# Patient Record
Sex: MALE | Race: OTHER | Employment: FULL TIME | URBAN - METROPOLITAN AREA
[De-identification: names, ages, dates, MRNs, and addresses within clinical notes are randomized per-mention and may not be internally consistent; named-entity substitution may affect disease eponyms.]

---

## 2017-03-10 ENCOUNTER — TELEPHONE (OUTPATIENT)
Dept: FAMILY MEDICINE CLINIC | Facility: CLINIC | Age: 27
End: 2017-03-10

## 2017-03-10 ENCOUNTER — HOSPITAL ENCOUNTER (OUTPATIENT)
Age: 27
Discharge: HOME | End: 2017-03-10
Attending: EMERGENCY MEDICINE
Payer: COMMERCIAL

## 2017-03-10 ENCOUNTER — APPOINTMENT (OUTPATIENT)
Dept: GENERAL RADIOLOGY | Age: 27
End: 2017-03-10
Attending: EMERGENCY MEDICINE
Payer: COMMERCIAL

## 2017-03-10 VITALS
RESPIRATION RATE: 18 BRPM | TEMPERATURE: 99 F | OXYGEN SATURATION: 98 % | HEART RATE: 88 BPM | SYSTOLIC BLOOD PRESSURE: 117 MMHG | DIASTOLIC BLOOD PRESSURE: 77 MMHG

## 2017-03-10 DIAGNOSIS — J40 BRONCHITIS: ICD-10-CM

## 2017-03-10 DIAGNOSIS — J02.0 STREP PHARYNGITIS: Primary | ICD-10-CM

## 2017-03-10 LAB — S PYO AG THROAT QL: POSITIVE

## 2017-03-10 PROCEDURE — 87430 STREP A AG IA: CPT

## 2017-03-10 PROCEDURE — 71020 XR CHEST PA + LAT CHEST (CPT=71020): CPT | Performed by: RADIOLOGY

## 2017-03-10 PROCEDURE — 99214 OFFICE O/P EST MOD 30 MIN: CPT

## 2017-03-10 PROCEDURE — 99213 OFFICE O/P EST LOW 20 MIN: CPT

## 2017-03-10 RX ORDER — AMOXICILLIN 500 MG/1
500 TABLET, FILM COATED ORAL 3 TIMES DAILY
Qty: 30 TABLET | Refills: 0 | Status: SHIPPED | OUTPATIENT
Start: 2017-03-10 | End: 2017-03-20

## 2017-03-10 NOTE — TELEPHONE ENCOUNTER
Reason for Call/Chief Complaint: chest congestion  Onset: one week  Nursing Assessment/Associated Symptoms: coughing, chest hurts, feels chest is very congestion.   ____________________________________________________  Medication List reviewed: yes  Gaurav Simmons

## 2017-03-10 NOTE — ED NOTES
Patient complains of a productive cough that is yellow-green, red, and black. Patient states that he does smoke cigarettes every day. Patient complains of SOB with no history of asthma. Patient in room and does not appear to be in any respiratory distress.

## 2017-03-10 NOTE — ED PROVIDER NOTES
Patient Seen in: [facility] In [address] [place] [address]    History   Patient presents with:  Cough/URI  Sore Throat    Stated Complaint: cold sx    HPI    Patient presents to the emergency department today complaining of sore throat, cough, congest Ear: Tympanic membrane and ear canal normal.   Left Ear: Tympanic membrane and ear canal normal.   Nose: Nose normal.   Mouth/Throat: Oropharyngeal exudate and posterior oropharyngeal erythema present.    Eyes: Conjunctivae and EOM are normal.   Neck: Kobi Hairston

## 2018-03-09 ENCOUNTER — OFFICE VISIT (OUTPATIENT)
Dept: INTERNAL MEDICINE CLINIC | Facility: CLINIC | Age: 28
End: 2018-03-09

## 2018-03-09 ENCOUNTER — TELEPHONE (OUTPATIENT)
Dept: OTHER | Age: 28
End: 2018-03-09

## 2018-03-09 VITALS
DIASTOLIC BLOOD PRESSURE: 78 MMHG | WEIGHT: 278 LBS | HEIGHT: 70 IN | TEMPERATURE: 99 F | HEART RATE: 92 BPM | SYSTOLIC BLOOD PRESSURE: 121 MMHG | BODY MASS INDEX: 39.8 KG/M2

## 2018-03-09 DIAGNOSIS — R19.7 DIARRHEA, UNSPECIFIED TYPE: Primary | ICD-10-CM

## 2018-03-09 PROCEDURE — 99213 OFFICE O/P EST LOW 20 MIN: CPT | Performed by: INTERNAL MEDICINE

## 2018-03-09 PROCEDURE — 99212 OFFICE O/P EST SF 10 MIN: CPT | Performed by: INTERNAL MEDICINE

## 2018-03-09 NOTE — PATIENT INSTRUCTIONS
ASSESSMENT/PLAN:   Diarrhea, unspecified type  (primary encounter diagnosis)- no need for ab, drink plenty of fluid , let us know if not better or if worst

## 2018-03-09 NOTE — PROGRESS NOTES
HPI:    Patient ID: Pramod Ca is a 33year old male.     HPI  Patient comes in today with complaint of diarrhea for 3 days he was in [facility] vacation diarrhea started as he landed back to [address] no blood in stool no fever no chills today feels little better is Normal range of motion. He exhibits no edema or tenderness. Neurological: He is alert and oriented to person, place, and time. He has normal reflexes. Skin: Skin is warm and dry. Psychiatric: He has a normal mood and affect.  His behavior is normal. J

## 2019-02-15 ENCOUNTER — OFFICE VISIT (OUTPATIENT)
Dept: FAMILY MEDICINE CLINIC | Facility: CLINIC | Age: 29
End: 2019-02-15
Payer: COMMERCIAL

## 2019-02-15 VITALS
SYSTOLIC BLOOD PRESSURE: 118 MMHG | DIASTOLIC BLOOD PRESSURE: 77 MMHG | WEIGHT: 280 LBS | HEART RATE: 65 BPM | BODY MASS INDEX: 40.09 KG/M2 | HEIGHT: 70 IN

## 2019-02-15 DIAGNOSIS — E66.01 CLASS 3 SEVERE OBESITY DUE TO EXCESS CALORIES WITHOUT SERIOUS COMORBIDITY WITH BODY MASS INDEX (BMI) OF 40.0 TO 44.9 IN ADULT (HCC): ICD-10-CM

## 2019-02-15 DIAGNOSIS — R06.83 SNORING: Primary | ICD-10-CM

## 2019-02-15 PROBLEM — E66.813 CLASS 3 SEVERE OBESITY DUE TO EXCESS CALORIES WITHOUT SERIOUS COMORBIDITY WITH BODY MASS INDEX (BMI) OF 40.0 TO 44.9 IN ADULT: Status: ACTIVE | Noted: 2019-02-15

## 2019-02-15 PROBLEM — E66.813 CLASS 3 SEVERE OBESITY DUE TO EXCESS CALORIES WITHOUT SERIOUS COMORBIDITY WITH BODY MASS INDEX (BMI) OF 40.0 TO 44.9 IN ADULT (HCC): Status: ACTIVE | Noted: 2019-02-15

## 2019-02-15 PROCEDURE — 99213 OFFICE O/P EST LOW 20 MIN: CPT | Performed by: NURSE PRACTITIONER

## 2019-02-15 NOTE — PATIENT INSTRUCTIONS
Everything you eat and drink over time matters. The right mix can help you be healthier now and in the future. Start with small changes to make healthier choices you can enjoy. Find your healthy eating style and maintain it for a lifetime.   This me is not intended as a substitute for professional medical care. Always follow your healthcare professional's instructions. What Are Snoring and Obstructive Sleep Apnea?   If Raquel Bautista ever had a stuffed-up nose, you know the feeling of trying to breathe in the structure of the nose may block breathing. A crooked (deviated) septum or swollen turbinates can make snoring worse or lead to apnea. Also, a receding jaw may make the tongue sit too far back.  Then it’s more likely to block the airway when you’re as harder. If you're overweight, ask your healthcare provider about a weight-loss program.  Exercise regularly  Exercise can help you lose weight, tone your muscles, and make your lungs work better. These changes may help improve your snoring.  Ask your health involve making changes to certain life habits. You can help your partner make and stick with these changes.  For example:  · Support and even join your partner’s exercise program.  · Be supportive if your partner gets CPAP (continuous positive airway pressu

## 2021-03-12 NOTE — ED INITIAL ASSESSMENT (HPI)
Pt presents for BP check, states yesterday he had a panic attack while at work. Felt chest pain, hot, sweaty. Denies history. Pt denies any symptom today.

## 2021-03-12 NOTE — ED PROVIDER NOTES
Patient Seen in: [facility]      History   No chief complaint on file.     Stated Complaint: high bp return to work    HPI/Subjective:   HPI    This is a well-appearing 67-year-old who presents with what he calls an anxiety attack yesterday at Head: Normocephalic and atraumatic.       Right Ear: Tympanic membrane, ear canal and external ear normal.      Left Ear: Tympanic membrane, ear canal and external ear normal.      Nose: Nose normal.      Mouth/Throat:      Mouth: Mucous membranes are moist close follow-up. I have given him a list of primary care providers he should follow-up with. I do not believe any laboratory work or imaging would be warranted at this time.   I have discussed with the patient if symptoms continue or start again he should

## 2021-08-06 ENCOUNTER — OFFICE VISIT (OUTPATIENT)
Dept: FAMILY MEDICINE CLINIC | Facility: CLINIC | Age: 31
End: 2021-08-06
Payer: COMMERCIAL

## 2021-08-06 VITALS
BODY MASS INDEX: 41.66 KG/M2 | DIASTOLIC BLOOD PRESSURE: 73 MMHG | HEIGHT: 70 IN | SYSTOLIC BLOOD PRESSURE: 111 MMHG | WEIGHT: 291 LBS | HEART RATE: 78 BPM

## 2021-08-06 DIAGNOSIS — R10.11 RUQ ABDOMINAL PAIN: Primary | ICD-10-CM

## 2021-08-06 PROCEDURE — 3008F BODY MASS INDEX DOCD: CPT | Performed by: NURSE PRACTITIONER

## 2021-08-06 PROCEDURE — 3078F DIAST BP <80 MM HG: CPT | Performed by: NURSE PRACTITIONER

## 2021-08-06 PROCEDURE — 3074F SYST BP LT 130 MM HG: CPT | Performed by: NURSE PRACTITIONER

## 2021-08-06 PROCEDURE — 99214 OFFICE O/P EST MOD 30 MIN: CPT | Performed by: NURSE PRACTITIONER

## 2021-08-06 NOTE — PATIENT INSTRUCTIONS
Cholecystitis (Presumed)     Your belly (abdominal) pain may be due to an inflammation and possible infection in the gallbladder. This is called cholecystitis. The gallbladder is a small sac under the liver. It stores and releases bile.  Bile is a fluid m gallbladder pain that go away, this is called biliary colic. Rest in bed and follow a clear liquid diet until the pain, upset stomach, and vomiting go away. Call your healthcare provider for an appointment.  Biliary colic can keep coming back and can cause (jaundice)  · Chest, arm, back, neck, or jaw pain  [employer] last reviewed this educational content on 11/1/2019  © 0014-8526 [employer]. All rights reserved. This information is not intended as a substitute for professional medical care.  Alw

## 2021-08-06 NOTE — PROGRESS NOTES
HPI  Pt here for ER follow up in [place] for gen abd pain. CBC and lipase within normal limits. Glu sl elevated but rest of cmp within normal limits. Had GI coctail in ER.    Was given pantoprazole and the other is bentyl    Eats a lot of spicy foods, drink Concerns:         Service: Not Asked        Blood Transfusions: Not Asked        Caffeine Concern: Yes          (Soda) 1 cup daily        Occupational Exposure: Not Asked        Hobby Hazards: Not Asked        Sleep Concern: Not Asked        Stress is abdominal tenderness in the right upper quadrant. Positive signs include Huang's sign. Neurological:      Mental Status: He is alert.        ER notes and labs reviewed  Assessment and Plan:   Problem List Items Addressed This Visit        Other    RUQ

## 2021-08-11 ENCOUNTER — OFFICE VISIT (OUTPATIENT)
Dept: FAMILY MEDICINE CLINIC | Facility: CLINIC | Age: 31
End: 2021-08-11
Payer: COMMERCIAL

## 2021-08-11 ENCOUNTER — LAB ENCOUNTER (OUTPATIENT)
Dept: LAB | Age: 31
End: 2021-08-11
Attending: NURSE PRACTITIONER
Payer: COMMERCIAL

## 2021-08-11 ENCOUNTER — PATIENT MESSAGE (OUTPATIENT)
Dept: FAMILY MEDICINE CLINIC | Facility: CLINIC | Age: 31
End: 2021-08-11

## 2021-08-11 VITALS
WEIGHT: 290 LBS | SYSTOLIC BLOOD PRESSURE: 120 MMHG | HEIGHT: 70 IN | BODY MASS INDEX: 41.52 KG/M2 | DIASTOLIC BLOOD PRESSURE: 81 MMHG | HEART RATE: 69 BPM

## 2021-08-11 DIAGNOSIS — K31.9 DYSPEPSIA AND DISORDER OF FUNCTION OF STOMACH: ICD-10-CM

## 2021-08-11 DIAGNOSIS — Z00.00 WELL ADULT EXAM: Primary | ICD-10-CM

## 2021-08-11 DIAGNOSIS — R10.13 DYSPEPSIA AND DISORDER OF FUNCTION OF STOMACH: ICD-10-CM

## 2021-08-11 DIAGNOSIS — E66.01 CLASS 3 SEVERE OBESITY DUE TO EXCESS CALORIES WITHOUT SERIOUS COMORBIDITY WITH BODY MASS INDEX (BMI) OF 40.0 TO 44.9 IN ADULT (HCC): ICD-10-CM

## 2021-08-11 DIAGNOSIS — Z00.00 WELL ADULT EXAM: ICD-10-CM

## 2021-08-11 DIAGNOSIS — R10.11 RUQ ABDOMINAL PAIN: ICD-10-CM

## 2021-08-11 LAB
ALBUMIN SERPL-MCNC: 3.7 G/DL (ref 3.4–5)
ALBUMIN/GLOB SERPL: 1 {RATIO} (ref 1–2)
ALP LIVER SERPL-CCNC: 65 U/L
ALT SERPL-CCNC: 22 U/L
ANION GAP SERPL CALC-SCNC: 5 MMOL/L (ref 0–18)
AST SERPL-CCNC: 19 U/L (ref 15–37)
BILIRUB SERPL-MCNC: 0.6 MG/DL (ref 0.1–2)
BUN BLD-MCNC: 10 MG/DL (ref 7–18)
BUN/CREAT SERPL: 11.4 (ref 10–20)
CALCIUM BLD-MCNC: 9 MG/DL (ref 8.5–10.1)
CHLORIDE SERPL-SCNC: 106 MMOL/L (ref 98–112)
CHOLEST SMN-MCNC: 194 MG/DL (ref ?–200)
CO2 SERPL-SCNC: 26 MMOL/L (ref 21–32)
CREAT BLD-MCNC: 0.88 MG/DL
DEPRECATED RDW RBC AUTO: 45.1 FL (ref 35.1–46.3)
ERYTHROCYTE [DISTWIDTH] IN BLOOD BY AUTOMATED COUNT: 13.8 % (ref 11–15)
EST. AVERAGE GLUCOSE BLD GHB EST-MCNC: 111 MG/DL (ref 68–126)
GLOBULIN PLAS-MCNC: 3.6 G/DL (ref 2.8–4.4)
GLUCOSE BLD-MCNC: 88 MG/DL (ref 70–99)
HBA1C MFR BLD HPLC: 5.5 % (ref ?–5.7)
HCT VFR BLD AUTO: 46.5 %
HDLC SERPL-MCNC: 42 MG/DL (ref 40–59)
HGB BLD-MCNC: 14.8 G/DL
LDLC SERPL CALC-MCNC: 127 MG/DL (ref ?–100)
M PROTEIN MFR SERPL ELPH: 7.3 G/DL (ref 6.4–8.2)
MCH RBC QN AUTO: 28.1 PG (ref 26–34)
MCHC RBC AUTO-ENTMCNC: 31.8 G/DL (ref 31–37)
MCV RBC AUTO: 88.2 FL
NONHDLC SERPL-MCNC: 152 MG/DL (ref ?–130)
OSMOLALITY SERPL CALC.SUM OF ELEC: 282 MOSM/KG (ref 275–295)
PATIENT FASTING Y/N/NP: YES
PATIENT FASTING Y/N/NP: YES
PLATELET # BLD AUTO: 252 10(3)UL (ref 150–450)
POTASSIUM SERPL-SCNC: 3.7 MMOL/L (ref 3.5–5.1)
RBC # BLD AUTO: 5.27 X10(6)UL
SODIUM SERPL-SCNC: 137 MMOL/L (ref 136–145)
TRIGL SERPL-MCNC: 142 MG/DL (ref 30–149)
TSI SER-ACNC: 1.19 MIU/ML (ref 0.36–3.74)
VIT B12 SERPL-MCNC: 328 PG/ML (ref 193–986)
VLDLC SERPL CALC-MCNC: 25 MG/DL (ref 0–30)
WBC # BLD AUTO: 6.3 X10(3) UL (ref 4–11)

## 2021-08-11 PROCEDURE — 3074F SYST BP LT 130 MM HG: CPT | Performed by: NURSE PRACTITIONER

## 2021-08-11 PROCEDURE — 99395 PREV VISIT EST AGE 18-39: CPT | Performed by: NURSE PRACTITIONER

## 2021-08-11 PROCEDURE — 80061 LIPID PANEL: CPT

## 2021-08-11 PROCEDURE — 83013 H PYLORI (C-13) BREATH: CPT | Performed by: NURSE PRACTITIONER

## 2021-08-11 PROCEDURE — 3079F DIAST BP 80-89 MM HG: CPT | Performed by: NURSE PRACTITIONER

## 2021-08-11 PROCEDURE — 85027 COMPLETE CBC AUTOMATED: CPT

## 2021-08-11 PROCEDURE — 3008F BODY MASS INDEX DOCD: CPT | Performed by: NURSE PRACTITIONER

## 2021-08-11 PROCEDURE — 83036 HEMOGLOBIN GLYCOSYLATED A1C: CPT

## 2021-08-11 PROCEDURE — 80053 COMPREHEN METABOLIC PANEL: CPT

## 2021-08-11 PROCEDURE — 82607 VITAMIN B-12: CPT

## 2021-08-11 PROCEDURE — 84443 ASSAY THYROID STIM HORMONE: CPT

## 2021-08-11 PROCEDURE — 36415 COLL VENOUS BLD VENIPUNCTURE: CPT

## 2021-08-11 NOTE — ASSESSMENT & PLAN NOTE
Declines referral at this time-states he used to work out and eat healthy all of the time in the past-used to be 100# less so he knows what he has to do.

## 2021-08-11 NOTE — PROGRESS NOTES
HPI  Pt here for adult physical.   RUQ is improved-ultrasound has not been scheduled yet. Diet-fair  Exercise-none  Works as a -is active w that.    Is -has two children ages 3 and 2    covid vaccines-x2 pfeizer  No h/o covid illne Former Smoker        Packs/day: 1.00        Types: Cigarettes        Quit date: 3/9/2017        Years since quittin.4      Smokeless tobacco: Former User    Substance and Sexual Activity      Alcohol use: Yes        Comment: weekends      Drug use:  No well-developed. He is obese. HENT:      Head: Normocephalic and atraumatic. Right Ear: Tympanic membrane, ear canal and external ear normal. There is no impacted cerumen.       Left Ear: Tympanic membrane, ear canal and external ear normal. There is Other    Class 3 severe obesity due to excess calories without serious comorbidity with body mass index (BMI) of 40.0 to 44.9 in adult [facility])     Declines referral at this time-states he used to work out and eat healthy all of the time in the past-used to b

## 2021-08-11 NOTE — ASSESSMENT & PLAN NOTE
Encourage healthy, low fat, spice and acid diet  GB ultrasound  Please call if symptoms worsen or are not resolving.

## 2021-08-12 NOTE — TELEPHONE ENCOUNTER
From: Jamie Ferguson  To: WILTON Donald  Sent: 8/11/2021 5:02 PM CDT  Subject: Visit Follow-up Question    Dennis Ferris I just wanted to see if it was possible for you to send me release paperwork so I can return back to work and also talking

## 2021-08-13 LAB — H. PYLORI BREATH TEST: NEGATIVE

## 2022-06-06 ENCOUNTER — OFFICE VISIT (OUTPATIENT)
Dept: FAMILY MEDICINE CLINIC | Facility: CLINIC | Age: 32
End: 2022-06-06
Payer: COMMERCIAL

## 2022-06-06 VITALS
DIASTOLIC BLOOD PRESSURE: 91 MMHG | HEIGHT: 70 IN | SYSTOLIC BLOOD PRESSURE: 135 MMHG | BODY MASS INDEX: 44.38 KG/M2 | WEIGHT: 310 LBS

## 2022-06-06 DIAGNOSIS — J30.1 SEASONAL ALLERGIC RHINITIS DUE TO POLLEN: Primary | ICD-10-CM

## 2022-06-06 RX ORDER — METHYLPREDNISOLONE ACETATE 80 MG/ML
80 INJECTION, SUSPENSION INTRA-ARTICULAR; INTRALESIONAL; INTRAMUSCULAR; SOFT TISSUE ONCE
Status: COMPLETED | OUTPATIENT
Start: 2022-06-06 | End: 2022-06-06

## 2022-06-06 RX ORDER — FLUTICASONE PROPIONATE 50 MCG
2 SPRAY, SUSPENSION (ML) NASAL DAILY
Qty: 3 EACH | Refills: 1 | Status: SHIPPED | OUTPATIENT
Start: 2022-06-06 | End: 2023-06-01

## 2022-06-06 RX ORDER — AZELASTINE HYDROCHLORIDE 0.5 MG/ML
1 SOLUTION/ DROPS OPHTHALMIC 2 TIMES DAILY
Qty: 6 ML | Refills: 3 | Status: SHIPPED | OUTPATIENT
Start: 2022-06-06

## 2022-06-06 RX ORDER — MONTELUKAST SODIUM 10 MG/1
10 TABLET ORAL DAILY
Qty: 90 TABLET | Refills: 3 | Status: SHIPPED | OUTPATIENT
Start: 2022-06-06 | End: 2023-06-01

## 2022-06-06 RX ADMIN — METHYLPREDNISOLONE ACETATE 80 MG: 80 INJECTION, SUSPENSION INTRA-ARTICULAR; INTRALESIONAL; INTRAMUSCULAR; SOFT TISSUE at 11:22:00

## 2022-06-07 NOTE — ASSESSMENT & PLAN NOTE
-otc non-drowsy antihistamine (generic claritin, zyrtec or allegra)  -add steroidal nasal spray (flonase, rhinocort -generic works well)    -supportive care discussed  -Please call if symptoms worsen or are not resolving.      Add singulair  Follow up allergy to discuss xolair inj

## 2024-02-14 NOTE — TELEPHONE ENCOUNTER
Pt called in for appt to see Dr. Austin Vick. He has had diarrhea for 2 days but it is resolving now. Not ready to go back to work but needs a Dr's note to be off from work over 2 days. No appt to see VS but pt was scheduled for this afternoon with Dr. Ramy Walker. Referral placed

## 2024-05-20 ENCOUNTER — HOSPITAL ENCOUNTER (OUTPATIENT)
Age: 34
Discharge: HOME | End: 2024-05-20

## 2024-05-20 VITALS
TEMPERATURE: 97 F | RESPIRATION RATE: 18 BRPM | OXYGEN SATURATION: 98 % | HEART RATE: 76 BPM | DIASTOLIC BLOOD PRESSURE: 80 MMHG | SYSTOLIC BLOOD PRESSURE: 113 MMHG

## 2024-05-20 DIAGNOSIS — J30.2 SEASONAL ALLERGIES: Primary | ICD-10-CM

## 2024-05-20 PROCEDURE — 99213 OFFICE O/P EST LOW 20 MIN: CPT | Performed by: NURSE PRACTITIONER

## 2024-05-20 RX ORDER — LEVOCETIRIZINE DIHYDROCHLORIDE 5 MG/1
5 TABLET, FILM COATED ORAL EVERY EVENING
Qty: 30 TABLET | Refills: 0 | Status: SHIPPED | OUTPATIENT
Start: 2024-05-20

## 2024-05-20 NOTE — ED PROVIDER NOTES
Patient Seen in: [facility]      History     Chief Complaint   Patient presents with    Cough/URI     Stated Complaint: Sneezing,runny nose,congestion,cough,sore throat    Subjective:   33-year-old male with no past medical history presents from home.  Patient is here with complaint of seasonal allergies.  States that he is sneezing upwards of 100 times a day.  Having runny nose, itchy eyes, throat irritation from coughing.  He is taking an unknown over-the-counter allergy pill that he found in the first-aid kit at work.  States he is requesting a yearly allergy shot.  He had an appointment with his primary doctor today but notes he was late and they would not see him.    The history is provided by the patient. No  was used.         Objective:   History reviewed. No pertinent past medical history.           History reviewed. No pertinent surgical history.             Social History     Socioeconomic History    Marital status: Single   Tobacco Use    Smoking status: Former     Current packs/day: 0.00     Types: Cigarettes     Quit date: 3/9/2017     Years since quittin.2    Smokeless tobacco: Former   Substance and Sexual Activity    Alcohol use: Yes     Comment: weekends    Drug use: No   Other Topics Concern    Caffeine Concern Yes     Comment: (Soda) 1 cup daily              Review of Systems    Positive for stated complaint: Sneezing,runny nose,congestion,cough,sore throat  Other systems are as noted in HPI.  Constitutional and vital signs reviewed.      All other systems reviewed and negative except as noted above.    Physical Exam     ED Triage Vitals [24 1555]   /80   Pulse 76   Resp 18   Temp 97.4 °F (36.3 °C)   Temp src Temporal   SpO2 98 %   O2 Device None (Room air)       Current Vitals:   Vital Signs  BP: 113/80  Pulse: 76  Resp: 18  Temp: 97.4 °F (36.3 °C)  Temp src: Temporal    Oxygen Therapy  SpO2: 98 %  O2 Device: None (Room air)            Physical  Exam  Vitals and nursing note reviewed.   Constitutional:       General: He is not in acute distress.     Appearance: Normal appearance. He is not ill-appearing or toxic-appearing.   HENT:      Head: Normocephalic and atraumatic.      Right Ear: Ear canal and external ear normal. There is impacted cerumen.      Left Ear: Tympanic membrane, ear canal and external ear normal.      Nose: Rhinorrhea present.      Mouth/Throat:      Mouth: Mucous membranes are moist.      Pharynx: Oropharynx is clear.   Eyes:      Pupils: Pupils are equal, round, and reactive to light.   Cardiovascular:      Rate and Rhythm: Normal rate and regular rhythm.      Pulses: Normal pulses.   Pulmonary:      Effort: Pulmonary effort is normal. No respiratory distress.      Breath sounds: Normal breath sounds.      Comments: Lungs clear.  No adventitious lung sounds.  No distress.  No hypoxia.  Pulse ox 98% ra. Which is normal    Abdominal:      General: Abdomen is flat.      Palpations: Abdomen is soft.   Musculoskeletal:         General: No signs of injury. Normal range of motion.      Cervical back: Normal range of motion and neck supple.   Skin:     General: Skin is warm and dry.      Capillary Refill: Capillary refill takes less than 2 seconds.   Neurological:      General: No focal deficit present.      Mental Status: He is alert and oriented to person, place, and time.      GCS: GCS eye subscore is 4. GCS verbal subscore is 5. GCS motor subscore is 6.   Psychiatric:         Mood and Affect: Mood normal.         Behavior: Behavior normal.         Thought Content: Thought content normal.         Judgment: Judgment normal.           ED Course   Labs Reviewed - No data to display    MDM         Medical Decision Making  Differential diagnosis: Allergic rhinitis, sinus infection, viral syndrome  Patient states symptoms consistent with recurrent allergy symptoms.  No evidence of bacterial infection  Here specifically requesting an allergy shot  which is not available at the   Recommend antihistamines.  Taking an unknown 1 that he found at work.  States Claritin does not work.  Recommend Xyzal.  Reschedule appointment with primary doctor    Results and plan of care discussed with the patient/family. They are in agreement with discharge. They understand to follow up with their primary doctor or the referral physician for further evaluation, especially if no improvement.  Also discussed the limitations of immediate care, patient is aware that if symptoms are worse they should go to the emergency room. Verbal and written discharge instructions were given.       Problems Addressed:  Seasonal allergies: acute illness or injury    Risk  Prescription drug management.        Disposition and Plan     Clinical Impression:  1. Seasonal allergies         Disposition:  Discharge  5/20/2024  4:02 pm    Follow-up:  Quan Larose, WILTON  [address] [place] [address]  [phone]                Medications Prescribed:  Discharge Medication List as of 5/20/2024  4:06 PM        START taking these medications    Details   levocetirizine (XYZAL ALLERGY 24HR) 5 MG Oral Tab Take 1 tablet (5 mg total) by mouth every evening., Normal, Disp-30 tablet, R-0

## 2024-05-21 ENCOUNTER — OFFICE VISIT (OUTPATIENT)
Dept: FAMILY MEDICINE CLINIC | Facility: CLINIC | Age: 34
End: 2024-05-21

## 2024-05-21 VITALS
SYSTOLIC BLOOD PRESSURE: 123 MMHG | HEART RATE: 87 BPM | HEIGHT: 70 IN | BODY MASS INDEX: 43.95 KG/M2 | WEIGHT: 307 LBS | DIASTOLIC BLOOD PRESSURE: 86 MMHG

## 2024-05-21 DIAGNOSIS — J30.1 SEASONAL ALLERGIC RHINITIS DUE TO POLLEN: Primary | ICD-10-CM

## 2024-05-21 PROCEDURE — 3074F SYST BP LT 130 MM HG: CPT | Performed by: NURSE PRACTITIONER

## 2024-05-21 PROCEDURE — 99213 OFFICE O/P EST LOW 20 MIN: CPT | Performed by: NURSE PRACTITIONER

## 2024-05-21 PROCEDURE — 3079F DIAST BP 80-89 MM HG: CPT | Performed by: NURSE PRACTITIONER

## 2024-05-21 PROCEDURE — 3008F BODY MASS INDEX DOCD: CPT | Performed by: NURSE PRACTITIONER

## 2024-05-21 PROCEDURE — 96372 THER/PROPH/DIAG INJ SC/IM: CPT | Performed by: NURSE PRACTITIONER

## 2024-05-21 RX ORDER — METHYLPREDNISOLONE ACETATE 80 MG/ML
80 INJECTION, SUSPENSION INTRA-ARTICULAR; INTRALESIONAL; INTRAMUSCULAR; SOFT TISSUE ONCE
Status: COMPLETED | OUTPATIENT
Start: 2024-05-21 | End: 2024-05-21

## 2024-05-21 RX ADMIN — METHYLPREDNISOLONE ACETATE 80 MG: 80 INJECTION, SUSPENSION INTRA-ARTICULAR; INTRALESIONAL; INTRAMUSCULAR; SOFT TISSUE at 16:49:00

## 2024-05-21 NOTE — PROGRESS NOTES
Allergies  Associated symptoms include congestion, coughing and a sore throat.     Pt presents for follow up seasonal allergies. Was seen in  yesterday for sneezing, rhinorrhea, itchy eyes, coughing and throat irritation.   He was prescribed over the counter allergy meds but would prefer injection-as received depo-medrol in the past and it works well for him. Has not started over the counter meds.   Has found that he has tried all of the different over the counter allergy meds without improvement in symptoms.     Review of Systems   Constitutional:  Negative for activity change and appetite change.   HENT:  Positive for congestion, rhinorrhea, sneezing and sore throat.    Respiratory:  Positive for cough.        Vitals:    05/21/24 1616   BP: 123/86   Pulse: 87   Weight: (!) 307 lb (139.3 kg)   Height: 5' 10\" (1.778 m)     Body mass index is 44.05 kg/m².  Wt Readings from Last 6 Encounters:   05/21/24 (!) 307 lb (139.3 kg)   06/06/22 (!) 310 lb (140.6 kg)   08/11/21 290 lb (131.5 kg)   08/06/21 291 lb (132 kg)   03/12/21 280 lb (127 kg)   02/15/19 280 lb (127 kg)         Health Maintenance   Topic Date Due    DTaP,Tdap,and Td Vaccines (1 - Tdap) Never done    Annual Physical  08/11/2022    COVID-19 Vaccine (4 - 2023-24 season) 09/01/2023    Annual Depression Screening  01/01/2024    Influenza Vaccine (Season Ended) 10/01/2024    Pneumococcal Vaccine: Birth to 64yrs  Aged Out       History reviewed. No pertinent past medical history.    .History reviewed. No pertinent surgical history.    Family History   Problem Relation Age of Onset    Hypertension Maternal Grandfather     Hypertension Paternal Grandfather        Social History     Socioeconomic History    Marital status: Single     Spouse name: Not on file    Number of children: Not on file    Years of education: Not on file    Highest education level: Not on file   Occupational History    Not on file   Tobacco Use    Smoking status: Former     Current  packs/day: 0.00     Types: Cigarettes     Quit date: 3/9/2017     Years since quittin.2    Smokeless tobacco: Former   Substance and Sexual Activity    Alcohol use: Yes     Comment: weekends    Drug use: No    Sexual activity: Not on file   Other Topics Concern     Service Not Asked    Blood Transfusions Not Asked    Caffeine Concern Yes     Comment: (Soda) 1 cup daily    Occupational Exposure Not Asked    Hobby Hazards Not Asked    Sleep Concern Not Asked    Stress Concern Not Asked    Weight Concern Not Asked    Special Diet Not Asked    Back Care Not Asked    Exercise Not Asked    Bike Helmet Not Asked    Seat Belt Not Asked    Self-Exams Not Asked   Social History Narrative    Not on file     Social Determinants of Health     Financial Resource Strain: Not on file   Food Insecurity: Not on file   Transportation Needs: Not on file   Physical Activity: Not on file   Stress: Not on file   Social Connections: Not on file   Housing Stability: Not on file       Current Outpatient Medications   Medication Sig Dispense Refill    levocetirizine (XYZAL ALLERGY 24HR) 5 MG Oral Tab Take 1 tablet (5 mg total) by mouth every evening. (Patient not taking: Reported on 2024) 30 tablet 0    Azelastine HCl 0.05 % Ophthalmic Solution Place 1 drop into the left eye 2 (two) times daily. (Patient not taking: Reported on 2024) 6 mL 3       Allergies:  No Known Allergies    Physical Exam  Vitals and nursing note reviewed.   Constitutional:       Appearance: Normal appearance. He is obese.   HENT:      Head: Normocephalic.   Cardiovascular:      Rate and Rhythm: Normal rate.   Pulmonary:      Effort: Pulmonary effort is normal. No respiratory distress.   Neurological:      Mental Status: He is alert and oriented to person, place, and time.        notes reviwed.   Assessment and Plan:   Problem List Items Addressed This Visit       Seasonal allergic rhinitis due to pollen - Primary    Relevant Medications     methylPREDNISolone acetate (DEPO-medrol) 80 MG/ML injection 80 mg         Pt encourage to return to office for annual physical .       Discussed plan of care with pt and pt is in agreement.All questions answered. Pt to call with questions or concerns.      Encouraged to sign up for My Chart if not already registered.

## 2024-06-05 ENCOUNTER — APPOINTMENT (OUTPATIENT)
Dept: GENERAL RADIOLOGY | Age: 34
End: 2024-06-05
Attending: NURSE PRACTITIONER
Payer: COMMERCIAL

## 2024-06-05 ENCOUNTER — HOSPITAL ENCOUNTER (OUTPATIENT)
Age: 34
Discharge: HOME | End: 2024-06-05
Payer: COMMERCIAL

## 2024-06-05 VITALS
TEMPERATURE: 98 F | RESPIRATION RATE: 18 BRPM | HEART RATE: 94 BPM | DIASTOLIC BLOOD PRESSURE: 84 MMHG | OXYGEN SATURATION: 98 % | SYSTOLIC BLOOD PRESSURE: 152 MMHG
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
## 2024-06-05 DIAGNOSIS — S50.01XA CONTUSION OF RIGHT ELBOW, INITIAL ENCOUNTER: ICD-10-CM

## 2024-06-05 PROCEDURE — 73080 X-RAY EXAM OF ELBOW: CPT | Performed by: NURSE PRACTITIONER

## 2024-06-05 PROCEDURE — 99213 OFFICE O/P EST LOW 20 MIN: CPT | Performed by: NURSE PRACTITIONER

## 2024-06-05 NOTE — ED PROVIDER NOTES
Patient Seen in: [facility]      History     Chief Complaint   Patient presents with    Elbow Injury     Stated Complaint: right elbow injury    Subjective:   34-year-old male with no past medical history presents from home.  Patient presents with a work injury.  Injury occurred  today around 1 PM.  He was using a tire  and it slipped and the pole hit him in the back of the right arm.  He is here complaining of a right elbow injury.  Isolated injury.  He did take ibuprofen around 2 PM and states the pain is decreased.  He has also been icing.  He is right-hand dominant.  He denies numbness or tingling to the right hand    The history is provided by the patient. No  was used.         Objective:   No pertinent past medical history.        HISTORY:  No past medical history on file.   No past surgical history on file.   Family History   Problem Relation Age of Onset    Hypertension Maternal Grandfather     Hypertension Paternal Grandfather       Social History     Socioeconomic History    Marital status: Single   Tobacco Use    Smoking status: Former     Current packs/day: 0.00     Types: Cigarettes     Quit date: 3/9/2017     Years since quittin.2    Smokeless tobacco: Former   Substance and Sexual Activity    Alcohol use: Yes     Comment: weekends    Drug use: No   Other Topics Concern    Caffeine Concern Yes     Comment: (Soda) 1 cup daily            No pertinent past surgical history.              No pertinent social history.            Review of Systems    Positive for stated complaint: right elbow injury  Other systems are as noted in HPI.  Constitutional and vital signs reviewed.      All other systems reviewed and negative except as noted above.    Physical Exam     ED Triage Vitals [24 1746]   /84   Pulse 94   Resp 18   Temp 97.7 °F (36.5 °C)   Temp src Temporal   SpO2 98 %   O2 Device None (Room air)       Current Vitals:   Vital Signs  BP: 152/84  Pulse:  94  Resp: 18  Temp: 97.7 °F (36.5 °C)  Temp src: Temporal    Oxygen Therapy  SpO2: 98 %  O2 Device: None (Room air)            Physical Exam  Vitals and nursing note reviewed.   Constitutional:       General: He is not in acute distress.     Appearance: Normal appearance. He is not ill-appearing or toxic-appearing.   HENT:      Head: Normocephalic and atraumatic.      Nose: Nose normal.      Mouth/Throat:      Mouth: Mucous membranes are moist.      Pharynx: Oropharynx is clear.   Eyes:      Pupils: Pupils are equal, round, and reactive to light.   Cardiovascular:      Rate and Rhythm: Normal rate and regular rhythm.      Pulses: Normal pulses.   Pulmonary:      Effort: Pulmonary effort is normal. No respiratory distress.      Breath sounds: Normal breath sounds.      Comments: Lungs clear.  No adventitious lung sounds.  No distress.  No hypoxia.  Pulse ox 98% ra. Which is normal    Abdominal:      General: Abdomen is flat.      Palpations: Abdomen is soft.   Musculoskeletal:         General: No signs of injury. Normal range of motion.      Right elbow: No swelling or deformity. Normal range of motion. Tenderness present in olecranon process.      Cervical back: Normal range of motion and neck supple.      Comments: No signs of trauma.  No swelling.  Patient has pain with flexion of the right elbow.  Right hand grasp intact.  CMS intact.   Skin:     General: Skin is warm and dry.      Capillary Refill: Capillary refill takes less than 2 seconds.   Neurological:      General: No focal deficit present.      Mental Status: He is alert and oriented to person, place, and time.      GCS: GCS eye subscore is 4. GCS verbal subscore is 5. GCS motor subscore is 6.   Psychiatric:         Mood and Affect: Mood normal.         Behavior: Behavior normal.         Thought Content: Thought content normal.         Judgment: Judgment normal.           ED Course   Labs Reviewed - No data to display  XR ELBOW, COMPLETE (MIN 3 VIEWS),  RIGHT (CPT=73080)    Result Date: 6/5/2024  CONCLUSION:   Mild posttraumatic olecranon bursitis without acute fracture or dislocation.     Dictated by (CST): Sathya Bourne MD on 6/05/2024 at 6:44 PM     Finalized by (CST): Sathya Bourne MD on 6/05/2024 at 6:45 PM           MDM        Medical Decision Making  Differential diagnoses reflecting the complexity of care include: Elbow fracture versus contusion  X-ray of the right elbow is negative for fracture or dislocation.  There is mild posttraumatic bursitis.  No open wounds    Plan of Care: Continue ibuprofen and ice  Patient requesting work restrictions.  Discussed with patient we are unable to specify restrictions.  I offered him a note to be off of work a few days which he declined.  He states he will follow-up with occupational health    Results and plan of care discussed with the patient/family. They are in agreement with discharge. They understand to follow up with their primary doctor or the referral physician for further evaluation, especially if no improvement.  Also discussed the limitations of immediate care, patient is aware that if symptoms are worse they should go to the emergency room. Verbal and written discharge instructions were given.     My independent interpretation of studies of: no elbow fracture              Problems Addressed:  Contusion of right elbow, initial encounter: acute illness or injury  Work related injury: acute illness or injury    Amount and/or Complexity of Data Reviewed  Radiology: ordered and independent interpretation performed. Decision-making details documented in ED Course.     Details: No fracture    Risk  OTC drugs.        Disposition and Plan     Clinical Impression:  1. Work related injury    2. Contusion of right elbow, initial encounter         Disposition:  Discharge  6/5/2024  6:50 pm    Follow-up:  Kristin Quach PA  [address] [place] [id number]  [phone]    Call       [facility]  [address] [address]  [phone]              Medications Prescribed:  Current Discharge Medication List

## 2024-06-05 NOTE — DISCHARGE INSTRUCTIONS
No fracture seen on the xray. Continue ibuprofen and ice. Follow up with orthopedics if persistent symptoms

## 2024-11-13 ENCOUNTER — NURSE TRIAGE (OUTPATIENT)
Dept: FAMILY MEDICINE CLINIC | Facility: CLINIC | Age: 34
End: 2024-11-13

## 2024-11-13 NOTE — TELEPHONE ENCOUNTER
Action Requested: Summary for Provider     []  Critical Lab, Recommendations Needed  [] Need Additional Advice  []   FYI    []   Need Orders  [] Need Medications Sent to Pharmacy  []  Other     SUMMARY: Per Protocol disposition advised to be seen in the office for abd pain.    Assisted patient with appt scheduling, verbalized understanding and agrees to plan.   Future Appointments   Date Time Provider Department Center   2024  3:45 PM Navarro Wharton DO [facility]     Reason for call: Acute  Onset: few days    Patient (name and  verified) c/o upper abd pain for the last few days. Improves with eating. Denies any vomiting, diarrhea or nausea.   Reason for Disposition   MILD pain (e.g., does not interfere with normal activities) and pain comes and goes (cramps) lasts > 48 hours  (Exception: This same abdominal pain is a chronic symptom recurrent or ongoing AND present > 4 weeks.)    Protocols used: Abdominal Pain - Male-[facility]-[place]

## 2024-11-13 NOTE — TELEPHONE ENCOUNTER
Patient scheduled an appointment via [facility] for the following concern:    Having stomach pain

## 2024-11-14 ENCOUNTER — LAB ENCOUNTER (OUTPATIENT)
Dept: LAB | Age: 34
End: 2024-11-14
Attending: FAMILY MEDICINE
Payer: COMMERCIAL

## 2024-11-14 ENCOUNTER — OFFICE VISIT (OUTPATIENT)
Dept: FAMILY MEDICINE CLINIC | Facility: CLINIC | Age: 34
End: 2024-11-14

## 2024-11-14 VITALS
DIASTOLIC BLOOD PRESSURE: 72 MMHG | BODY MASS INDEX: 45.1 KG/M2 | WEIGHT: 315 LBS | HEART RATE: 88 BPM | TEMPERATURE: 98 F | HEIGHT: 70 IN | SYSTOLIC BLOOD PRESSURE: 113 MMHG

## 2024-11-14 DIAGNOSIS — R10.11 RUQ ABDOMINAL PAIN: ICD-10-CM

## 2024-11-14 DIAGNOSIS — R10.13 EPIGASTRIC ABDOMINAL PAIN: Primary | ICD-10-CM

## 2024-11-14 DIAGNOSIS — K21.9 GASTROESOPHAGEAL REFLUX DISEASE, UNSPECIFIED WHETHER ESOPHAGITIS PRESENT: ICD-10-CM

## 2024-11-14 DIAGNOSIS — R10.13 EPIGASTRIC ABDOMINAL PAIN: ICD-10-CM

## 2024-11-14 LAB
ALBUMIN SERPL-MCNC: 4.7 G/DL (ref 3.2–4.8)
ALBUMIN/GLOB SERPL: 1.4 {RATIO} (ref 1–2)
ALP LIVER SERPL-CCNC: 77 U/L
ALT SERPL-CCNC: 15 U/L
ANION GAP SERPL CALC-SCNC: 6 MMOL/L (ref 0–18)
AST SERPL-CCNC: 21 U/L (ref ?–34)
BASOPHILS # BLD AUTO: 0.04 X10(3) UL (ref 0–0.2)
BASOPHILS NFR BLD AUTO: 0.4 %
BILIRUB SERPL-MCNC: 0.5 MG/DL (ref 0.3–1.2)
BUN BLD-MCNC: 11 MG/DL (ref 9–23)
BUN/CREAT SERPL: 10.3 (ref 10–20)
CALCIUM BLD-MCNC: 10.4 MG/DL (ref 8.7–10.4)
CHLORIDE SERPL-SCNC: 103 MMOL/L (ref 98–112)
CO2 SERPL-SCNC: 30 MMOL/L (ref 21–32)
CREAT BLD-MCNC: 1.07 MG/DL
DEPRECATED RDW RBC AUTO: 44.9 FL (ref 35.1–46.3)
EGFRCR SERPLBLD CKD-EPI 2021: 94 ML/MIN/1.73M2 (ref 60–?)
EOSINOPHIL # BLD AUTO: 0.09 X10(3) UL (ref 0–0.7)
EOSINOPHIL NFR BLD AUTO: 0.9 %
ERYTHROCYTE [DISTWIDTH] IN BLOOD BY AUTOMATED COUNT: 13.8 % (ref 11–15)
FASTING STATUS PATIENT QL REPORTED: YES
GLOBULIN PLAS-MCNC: 3.3 G/DL (ref 2–3.5)
GLUCOSE BLD-MCNC: 89 MG/DL (ref 70–99)
HCT VFR BLD AUTO: 46.6 %
HGB BLD-MCNC: 15.8 G/DL
IMM GRANULOCYTES # BLD AUTO: 0.03 X10(3) UL (ref 0–1)
IMM GRANULOCYTES NFR BLD: 0.3 %
LIPASE SERPL-CCNC: 22 U/L (ref 12–53)
LYMPHOCYTES # BLD AUTO: 2.77 X10(3) UL (ref 1–4)
LYMPHOCYTES NFR BLD AUTO: 28.2 %
MCH RBC QN AUTO: 30.8 PG (ref 26–34)
MCHC RBC AUTO-ENTMCNC: 33.9 G/DL (ref 31–37)
MCV RBC AUTO: 90.8 FL
MONOCYTES # BLD AUTO: 0.56 X10(3) UL (ref 0.1–1)
MONOCYTES NFR BLD AUTO: 5.7 %
NEUTROPHILS # BLD AUTO: 6.32 X10 (3) UL (ref 1.5–7.7)
NEUTROPHILS # BLD AUTO: 6.32 X10(3) UL (ref 1.5–7.7)
NEUTROPHILS NFR BLD AUTO: 64.5 %
OSMOLALITY SERPL CALC.SUM OF ELEC: 287 MOSM/KG (ref 275–295)
PLATELET # BLD AUTO: 267 10(3)UL (ref 150–450)
POTASSIUM SERPL-SCNC: 4 MMOL/L (ref 3.5–5.1)
PROT SERPL-MCNC: 8 G/DL (ref 5.7–8.2)
RBC # BLD AUTO: 5.13 X10(6)UL
SODIUM SERPL-SCNC: 139 MMOL/L (ref 136–145)
WBC # BLD AUTO: 9.8 X10(3) UL (ref 4–11)

## 2024-11-14 PROCEDURE — 83013 H PYLORI (C-13) BREATH: CPT

## 2024-11-14 PROCEDURE — 85025 COMPLETE CBC W/AUTO DIFF WBC: CPT

## 2024-11-14 PROCEDURE — 80053 COMPREHEN METABOLIC PANEL: CPT

## 2024-11-14 PROCEDURE — 3008F BODY MASS INDEX DOCD: CPT | Performed by: FAMILY MEDICINE

## 2024-11-14 PROCEDURE — 3078F DIAST BP <80 MM HG: CPT | Performed by: FAMILY MEDICINE

## 2024-11-14 PROCEDURE — 36415 COLL VENOUS BLD VENIPUNCTURE: CPT

## 2024-11-14 PROCEDURE — 3074F SYST BP LT 130 MM HG: CPT | Performed by: FAMILY MEDICINE

## 2024-11-14 PROCEDURE — 99214 OFFICE O/P EST MOD 30 MIN: CPT | Performed by: FAMILY MEDICINE

## 2024-11-14 PROCEDURE — 83690 ASSAY OF LIPASE: CPT

## 2024-11-14 RX ORDER — PANTOPRAZOLE SODIUM 40 MG/1
40 TABLET, DELAYED RELEASE ORAL
Qty: 90 TABLET | Refills: 1 | Status: SHIPPED | OUTPATIENT
Start: 2024-11-14 | End: 2024-11-14

## 2024-11-14 RX ORDER — PANTOPRAZOLE SODIUM 40 MG/1
40 TABLET, DELAYED RELEASE ORAL
Qty: 90 TABLET | Refills: 1 | Status: SHIPPED | OUTPATIENT
Start: 2024-11-14 | End: 2025-11-14

## 2024-11-14 NOTE — PROGRESS NOTES
Patient ID: Refugio Hernandez is a 34 year old male.    HPI  Chief Complaint   Patient presents with    Abdominal Pain     X 4 days    Heartburn     X 4 days     Action Requested: Summary for Provider      []  Critical Lab, Recommendations Needed  [] Need Additional Advice  []   FYI    []   Need Orders  [] Need Medications Sent to Pharmacy  []  Other      SUMMARY: Per Protocol disposition advised to be seen in the office for abd pain.    Assisted patient with appt scheduling, verbalized understanding and agrees to plan.          Future Appointments   Date Time Provider Department Center   2024  3:45 PM Navarro Wharton DO [facility]      Reason for call: Acute  Onset: few days     Patient (name and  verified) c/o upper abd pain for the last few days. Improves with eating. Denies any vomiting, diarrhea or nausea.   Reason for Disposition   MILD pain (e.g., does not interfere with normal activities) and pain comes and goes (cramps) lasts > 48 hours  (Exception: This same abdominal pain is a chronic symptom recurrent or ongoing AND present > 4 weeks.)    Protocols used: Abdominal Pain - Male-A-[place]  ========================================================================   Last seen on 2014.       Pt is not a heavy drinker. He drinks a couple times a month, he hasn't drank in 3 weeks. He had a new baby on Monday, two other kids at home.     Pt c/o abdominal pain for the last 4 days. He points to epigastric region. He has both pain and bloating int that area. He also states that food helps the pain and he feels like an empty hole when he doesn't eat. Pt also states that he has a burning feeling in the area that goes up into his throat. He has been using Tums with no relief.  He was wondering if pain could be due to taking Advil as he has had a cold for the last 5 days; I told him it was unlikely as he only took 1 or 2 tablets periodically for the last few days. Denies dysphagia. H. Pylori test was negative in  2021. He still has his gallbladder. On the night of 11/11/2024 he was awoken from sleep due to the pain although it was not severe enough that he felt like he was going to vomit. On 11/13/2024 he had 1 bout of diarrhea, but he had a normal bowel movement today. Denies blood in the stool. Denies hematuria or dysuria. I discussed with him.       Wt Readings from Last 6 Encounters:   11/14/24 (!) 316 lb   05/21/24 (!) 307 lb   06/06/22 (!) 310 lb   08/11/21 290 lb   08/06/21 291 lb   03/12/21 280 lb       BMI Readings from Last 6 Encounters:   11/14/24 45.34 kg/m²   05/21/24 44.05 kg/m²   06/06/22 44.48 kg/m²   08/11/21 41.61 kg/m²   08/06/21 41.75 kg/m²   03/12/21 40.18 kg/m²       BP Readings from Last 6 Encounters:   11/14/24 113/72   06/05/24 152/84   05/21/24 123/86   05/20/24 113/80   06/06/22 (!) 135/91   08/11/21 120/81         Review of Systems   Respiratory:  Negative for shortness of breath.    Cardiovascular:  Negative for chest pain.   Gastrointestinal:  Positive for abdominal pain. Negative for blood in stool, nausea and vomiting.   Genitourinary:  Negative for dysuria and hematuria.           Medical History:      History reviewed. No pertinent past medical history.    History reviewed. No pertinent surgical history.       Current Outpatient Medications   Medication Sig Dispense Refill    levocetirizine (XYZAL ALLERGY 24HR) 5 MG Oral Tab Take 1 tablet (5 mg total) by mouth every evening. (Patient not taking: Reported on 5/21/2024) 30 tablet 0    Azelastine HCl 0.05 % Ophthalmic Solution Place 1 drop into the left eye 2 (two) times daily. (Patient not taking: Reported on 5/21/2024) 6 mL 3     Allergies:Allergies[1]     Physical Exam:       Physical Exam  Blood pressure 113/72, pulse 88, temperature 97.5 °F (36.4 °C), temperature source Temporal, height 5' 10\" (1.778 m), weight (!) 316 lb.      Physical Exam   Constitutional: Patient is oriented to person, place, and time. Patient appears well-developed  and well-nourished. No distress.   Head: Normocephalic.   Cardiovascular: Normal rate, regular rhythm and normal heart sounds.    Pulmonary/Chest: Effort normal and breath sounds normal. No respiratory distress.   Neurological: Patient is alert and oriented to person, place, and time.   Skin: Skin is warm.   Psychiatry: Normal mood and affect.  Abdominal: Normal appearance and bowel sounds are normal. There is no rigidity, no rebound, no guarding. Tender in epigastric region. Positive RUQ with positive Krsih's sign initially but when I palpated the right upper quadrant again it was now much less tenderness and negative Huang's..     Vitals reviewed.           Assessment/Plan:      Diagnoses and all orders for this visit:    Epigastric abdominal pain  We will get labs done.  He is not having a surgical abdomen.  Lets start him on pantoprazole as Tums is not working.  If his pain becomes worse he needs to go to the emergency room.  He understands the plan.  -     CBC With Differential With Platelet; Future  -     Comp Metabolic Panel (14); Future  -     Helicobacter Pylori Breath Test; Future  -     Lipase [E]; Future  -     pantoprazole 40 MG Oral Tab EC; Take 1 tablet (40 mg total) by mouth every morning before breakfast. To help with stomach acid and stomach irritation/inflammation    Gastroesophageal reflux disease, unspecified whether esophagitis present  As above  -     CBC With Differential With Platelet; Future  -     Comp Metabolic Panel (14); Future  -     Helicobacter Pylori Breath Test; Future  -     Lipase [E]; Future  -     pantoprazole 40 MG Oral Tab EC; Take 1 tablet (40 mg total) by mouth every morning before breakfast. To help with stomach acid and stomach irritation/inflammation    RUQ abdominal pain  -     CBC With Differential With Platelet; Future  -     Comp Metabolic Panel (14); Future  -     Helicobacter Pylori Breath Test; Future  -     Lipase [E]; Future  Await labs and we will see if he  needs an ultrasound of his liver.      Referrals (if applicable)  No orders of the defined types were placed in this encounter.      Follow up if symptoms persist.  Take medicine (if given) as prescribed.  Approach to treatment discussed and patient/family member understands and agrees to plan.     No follow-ups on file.    There are no Patient Instructions on file for this visit.    Tess Yeung    11/14/2024    By signing my name below, ITess,  attest that this documentation has been prepared under the direction and in the presence of Navarro Wharton DO.   Electronically Signed: Tess Yeung, 11/14/2024, 3:37 PM.    I, Navarro Wharton DO,  personally performed the services described in this documentation. All medical record entries made by the scribe were at my direction and in my presence.  I have reviewed the chart and discharge instructions (if applicable) and agree that the record reflects my personal performance and is accurate and complete.  Navarro Wharton DO, 11/15/2024, 7:52 AM                  [1] No Known Allergies

## 2024-11-16 LAB — H PYLORI BREATH TEST: NEGATIVE

## 2025-06-11 ENCOUNTER — OFFICE VISIT (OUTPATIENT)
Dept: FAMILY MEDICINE CLINIC | Facility: CLINIC | Age: 35
End: 2025-06-11

## 2025-06-11 VITALS
WEIGHT: 315 LBS | SYSTOLIC BLOOD PRESSURE: 108 MMHG | OXYGEN SATURATION: 94 % | BODY MASS INDEX: 45.1 KG/M2 | HEIGHT: 70 IN | HEART RATE: 89 BPM | DIASTOLIC BLOOD PRESSURE: 71 MMHG

## 2025-06-11 DIAGNOSIS — R06.7 SNEEZING: ICD-10-CM

## 2025-06-11 DIAGNOSIS — H10.13 ALLERGIC CONJUNCTIVITIS OF BOTH EYES: ICD-10-CM

## 2025-06-11 DIAGNOSIS — J01.90 SUBACUTE SINUSITIS, UNSPECIFIED LOCATION: ICD-10-CM

## 2025-06-11 DIAGNOSIS — R09.81 NASAL CONGESTION: Primary | ICD-10-CM

## 2025-06-11 PROCEDURE — 3074F SYST BP LT 130 MM HG: CPT | Performed by: FAMILY MEDICINE

## 2025-06-11 PROCEDURE — 3078F DIAST BP <80 MM HG: CPT | Performed by: FAMILY MEDICINE

## 2025-06-11 PROCEDURE — 3008F BODY MASS INDEX DOCD: CPT | Performed by: FAMILY MEDICINE

## 2025-06-11 PROCEDURE — 99213 OFFICE O/P EST LOW 20 MIN: CPT | Performed by: FAMILY MEDICINE

## 2025-06-11 RX ORDER — FLUTICASONE PROPIONATE 50 MCG
2 SPRAY, SUSPENSION (ML) NASAL NIGHTLY
Qty: 1 EACH | Refills: 0 | Status: SHIPPED | OUTPATIENT
Start: 2025-06-11 | End: 2026-06-06

## 2025-06-11 RX ORDER — AMOXICILLIN 875 MG/1
875 TABLET, COATED ORAL 2 TIMES DAILY
Qty: 20 TABLET | Refills: 0 | Status: SHIPPED | OUTPATIENT
Start: 2025-06-11 | End: 2025-06-21

## 2025-06-11 RX ORDER — AZELASTINE HYDROCHLORIDE 0.5 MG/ML
1 SOLUTION/ DROPS OPHTHALMIC 2 TIMES DAILY
Qty: 1 EACH | Refills: 3 | Status: SHIPPED | OUTPATIENT
Start: 2025-06-11

## 2025-06-11 RX ORDER — LEVOCETIRIZINE DIHYDROCHLORIDE 5 MG/1
5 TABLET, FILM COATED ORAL EVERY EVENING
Qty: 30 TABLET | Refills: 1 | Status: SHIPPED | OUTPATIENT
Start: 2025-06-11

## 2025-06-11 NOTE — PROGRESS NOTES
HPI:    Patient ID: Refugio Hernandez is a 35 year old male.      Allergies  Associated symptoms include congestion. Pertinent negatives include no chills, coughing, fever or sore throat.       Chief Complaint   Patient presents with    Allergies     Pt has been experiencing allergies due to pollen, he has been having itchy eyes, runny nose, sneezing.        Wt Readings from Last 6 Encounters:   06/11/25 (!) 327 lb (148.3 kg)   11/14/24 (!) 316 lb (143.3 kg)   05/21/24 (!) 307 lb (139.3 kg)   06/06/22 (!) 310 lb (140.6 kg)   08/11/21 290 lb (131.5 kg)   08/06/21 291 lb (132 kg)     BP Readings from Last 3 Encounters:   06/11/25 108/71   11/14/24 113/72   06/05/24 152/84       Patient has bene having seasonal allergies last 3-4 years  Sees Loni NP  Have been worse    A lot of sneezing.  Eyes get itchy.  Works as a .      Has a stuffy nose.  Took benadryl a lot    Symptoms since 3-4 weeks  No relief with otc allergy meds and nasal sprays.    Slight sinus pressure  Non smoker  No pets.  No vaping.    States he would get methylpredisolone injection  usually to help with his allergies       Review of Systems   Constitutional:  Negative for chills and fever.   HENT:  Positive for congestion, sinus pressure and sneezing. Negative for ear pain, sinus pain, sore throat, tinnitus, trouble swallowing and voice change.    Eyes:  Positive for itching. Negative for pain, discharge, redness and visual disturbance.   Respiratory:  Negative for cough and shortness of breath.        /71 (BP Location: Right arm, Patient Position: Sitting, Cuff Size: large)   Pulse 89   Ht 5' 10\" (1.778 m)   Wt (!) 327 lb (148.3 kg)   SpO2 94%   BMI 46.92 kg/m²        Current Medications[1]  Allergies:Allergies[2]   PHYSICAL EXAM:     Chief Complaint   Patient presents with    Allergies     Pt has been experiencing allergies due to pollen, he has been having itchy eyes, runny nose, sneezing.       Physical Exam  Vitals reviewed.    Constitutional:       Appearance: He is obese.   HENT:      Right Ear: Tympanic membrane and ear canal normal.      Left Ear: Tympanic membrane and ear canal normal.      Nose: Congestion present.      Mouth/Throat:      Pharynx: No oropharyngeal exudate or posterior oropharyngeal erythema.   Eyes:      Extraocular Movements: Extraocular movements intact.      Pupils: Pupils are equal, round, and reactive to light.   Cardiovascular:      Rate and Rhythm: Normal rate and regular rhythm.      Pulses: Normal pulses.      Heart sounds: Normal heart sounds.   Pulmonary:      Effort: Pulmonary effort is normal.      Breath sounds: Normal breath sounds.   Musculoskeletal:      Cervical back: Normal range of motion and neck supple.   Lymphadenopathy:      Cervical: No cervical adenopathy.   Neurological:      Mental Status: He is alert.                ASSESSMENT/PLAN:     Encounter Diagnoses   Name Primary?    Nasal congestion Yes    Sneezing     Subacute sinusitis, unspecified location     Allergic conjunctivitis of both eyes        1. Nasal congestion  Take medications as directed. Side effects/ risks discussed and patient verbalized understanding of plan. To follow up if symptoms worsen.    - levocetirizine 5 MG Oral Tab; Take 1 tablet (5 mg total) by mouth every evening.  Dispense: 30 tablet; Refill: 1  - fluticasone propionate 50 MCG/ACT Nasal Suspension; 2 sprays by Each Nare route nightly.  Dispense: 1 each; Refill: 0  - ALLERGY - INTERNAL    2. Sneezing  Take medications as directed. Side effects/ risks discussed and patient verbalized understanding of plan. To follow up if symptoms worsen.    - levocetirizine 5 MG Oral Tab; Take 1 tablet (5 mg total) by mouth every evening.  Dispense: 30 tablet; Refill: 1  - fluticasone propionate 50 MCG/ACT Nasal Suspension; 2 sprays by Each Nare route nightly.  Dispense: 1 each; Refill: 0  - ALLERGY - INTERNAL    3. Subacute sinusitis, unspecified location  Take medications as  directed. Side effects/ risks discussed and patient verbalized understanding of plan. To follow up if symptoms worsen.    - amoxicillin 875 MG Oral Tab; Take 1 tablet (875 mg total) by mouth 2 (two) times daily for 10 days.  Dispense: 20 tablet; Refill: 0    4. Allergic conjunctivitis of both eyes    - Azelastine HCl 0.05 % Ophthalmic Solution; Place 1 drop into both eyes 2 (two) times daily.  Dispense: 1 each; Refill: 3      No orders of the defined types were placed in this encounter.        The above note was creating using Dragon speech recognition technology. Please excuse any typos    Meds This Visit:  Requested Prescriptions     Signed Prescriptions Disp Refills    levocetirizine 5 MG Oral Tab 30 tablet 1     Sig: Take 1 tablet (5 mg total) by mouth every evening.    fluticasone propionate 50 MCG/ACT Nasal Suspension 1 each 0     Si sprays by Each Nare route nightly.    amoxicillin 875 MG Oral Tab 20 tablet 0     Sig: Take 1 tablet (875 mg total) by mouth 2 (two) times daily for 10 days.    Azelastine HCl 0.05 % Ophthalmic Solution 1 each 3     Sig: Place 1 drop into both eyes 2 (two) times daily.       Imaging & Referrals:  ALLERGY - INTERNAL       ID#[id number]       [1]   Current Outpatient Medications   Medication Sig Dispense Refill    levocetirizine 5 MG Oral Tab Take 1 tablet (5 mg total) by mouth every evening. 30 tablet 1    fluticasone propionate 50 MCG/ACT Nasal Suspension 2 sprays by Each Nare route nightly. 1 each 0    amoxicillin 875 MG Oral Tab Take 1 tablet (875 mg total) by mouth 2 (two) times daily for 10 days. 20 tablet 0    Azelastine HCl 0.05 % Ophthalmic Solution Place 1 drop into both eyes 2 (two) times daily. 1 each 3    pantoprazole 40 MG Oral Tab EC Take 1 tablet (40 mg total) by mouth every morning before breakfast. To help with stomach acid and stomach irritation/inflammation 90 tablet 1   [2] No Known Allergies

## 2025-07-09 ENCOUNTER — OFFICE VISIT (OUTPATIENT)
Dept: FAMILY MEDICINE CLINIC | Facility: CLINIC | Age: 35
End: 2025-07-09
Payer: COMMERCIAL

## 2025-07-09 VITALS
HEIGHT: 70 IN | BODY MASS INDEX: 45.1 KG/M2 | SYSTOLIC BLOOD PRESSURE: 122 MMHG | TEMPERATURE: 97 F | WEIGHT: 315 LBS | HEART RATE: 72 BPM | DIASTOLIC BLOOD PRESSURE: 72 MMHG

## 2025-07-09 DIAGNOSIS — E66.813 CLASS 3 SEVERE OBESITY DUE TO EXCESS CALORIES WITHOUT SERIOUS COMORBIDITY WITH BODY MASS INDEX (BMI) OF 45.0 TO 49.9 IN ADULT: ICD-10-CM

## 2025-07-09 DIAGNOSIS — Z00.00 WELL ADULT EXAM: Primary | ICD-10-CM

## 2025-07-09 NOTE — PROGRESS NOTES
HPI  Pt here discuss weight . Is looking for ways to improve health, lose weight an set better example for children.   States friend, sister and cousin have all recently had some form of weight loss surgery and have been successful in losing a significant amount of weight.     Has been trying to cut back on carbs, drinking more water    Walks for exercise-most days.   Works as a     Review of Systems   Constitutional:  Positive for fatigue. Negative for activity change, appetite change and unexpected weight change.   Endocrine: Negative for polydipsia and polyuria.   Musculoskeletal:  Negative for arthralgias and myalgias.       Vitals:    07/09/25 1615   BP: 122/72   Pulse: 72   Temp: 97 °F (36.1 °C)   Weight: (!) 323 lb 3.2 oz (146.6 kg)   Height: 5' 10\" (1.778 m)     Body mass index is 46.37 kg/m².  Wt Readings from Last 6 Encounters:   07/09/25 (!) 323 lb 3.2 oz (146.6 kg)   06/11/25 (!) 327 lb (148.3 kg)   11/14/24 (!) 316 lb (143.3 kg)   05/21/24 (!) 307 lb (139.3 kg)   06/06/22 (!) 310 lb (140.6 kg)   08/11/21 290 lb (131.5 kg)     BP Readings from Last 5 Encounters:   07/09/25 122/72   06/11/25 108/71   11/14/24 113/72   06/05/24 152/84   05/21/24 123/86         Health Maintenance   Topic Date Due    Annual Physical  Never done    DTaP,Tdap,and Td Vaccines (1 - Tdap) Never done    COVID-19 Vaccine (4 - 2024-25 season) 09/01/2024    Annual Depression Screening  01/01/2025    Influenza Vaccine (1) 10/01/2025    Pneumococcal Vaccine: Birth to 50yrs  Aged Out    Meningococcal B Vaccine  Aged Out       Past Medical History[1]    .Past Surgical History[2]    Family History[3]    Social History     Socioeconomic History    Marital status: Single     Spouse name: Not on file    Number of children: Not on file    Years of education: Not on file    Highest education level: Not on file   Occupational History    Not on file   Tobacco Use    Smoking status: Former     Current packs/day: 0.00     Types:  Cigarettes     Quit date: 3/9/2017     Years since quittin.3    Smokeless tobacco: Former   Vaping Use    Vaping status: Never Used   Substance and Sexual Activity    Alcohol use: Yes     Comment: weekends    Drug use: No    Sexual activity: Not on file   Other Topics Concern     Service Not Asked    Blood Transfusions Not Asked    Caffeine Concern Yes     Comment: (Soda) 1 cup daily    Occupational Exposure Not Asked    Hobby Hazards Not Asked    Sleep Concern Not Asked    Stress Concern Not Asked    Weight Concern Not Asked    Special Diet Not Asked    Back Care Not Asked    Exercise Not Asked    Bike Helmet Not Asked    Seat Belt Not Asked    Self-Exams Not Asked   Social History Narrative    Not on file     Social Drivers of Health     Food Insecurity: Not on file   Transportation Needs: Not on file   Stress: Not on file   Housing Stability: Not on file       Current Medications[4]    Allergies:  Allergies[5]    Physical Exam  Vitals and nursing note reviewed.   Constitutional:       Appearance: Normal appearance. He is obese.   Cardiovascular:      Rate and Rhythm: Normal rate.   Pulmonary:      Effort: Pulmonary effort is normal. No respiratory distress.         Assessment and Plan:   Problem List Items Addressed This Visit       Well adult exam - Primary    Screening labs ordered  Return for well adult physical   I recommend that you try/continue to decrease the amount of carbohydrates that you ingest (bread products, rice, pasta, potatoes, cereals, starchy vegetables and high sugar containing foods and  beverages). Also try to increase the amount of vegetables and protein that you eat daily.  Decrease the amount of processed and fast foods. Try to exercise at least 30 minutes per day, 4-5 times per week, combination of cardio and weight training.              Relevant Orders    CBC, Platelet; No Differential    Comp Metabolic Panel (14)    Hemoglobin A1C    Insulin    Lipid Panel    TSH W Reflex  To Free T4    Vitamin B12    Vitamin D               Discussed plan of care with pt and pt is in agreement.All questions answered. Pt to call with questions or concerns.      Encouraged to sign up for My Chart if not already registered.     Note to patient and family:  The 21st Century Cures Act makes medical notes available to patients in the interest of transparency.  However, please be advised that this is a medical document.  It is intended as a peer to peer communication.  It is written in medical language and may contain abbreviations or verbiage that are technical and unfamiliar.  It may appear blunt or direct.  Medical documents are intended to carry relevant information, facts as evident, and the clinical opinion of the practitioner.           [1] History reviewed. No pertinent past medical history.  [2] History reviewed. No pertinent surgical history.  [3]   Family History  Problem Relation Age of Onset    Hypertension Maternal Grandfather     Hypertension Paternal Grandfather    [4]   Current Outpatient Medications   Medication Sig Dispense Refill    levocetirizine 5 MG Oral Tab Take 1 tablet (5 mg total) by mouth every evening. 30 tablet 1    fluticasone propionate 50 MCG/ACT Nasal Suspension 2 sprays by Each Nare route nightly. 1 each 0    Azelastine HCl 0.05 % Ophthalmic Solution Place 1 drop into both eyes 2 (two) times daily. 1 each 3    pantoprazole 40 MG Oral Tab EC Take 1 tablet (40 mg total) by mouth every morning before breakfast. To help with stomach acid and stomach irritation/inflammation 90 tablet 1   [5] No Known Allergies

## 2025-07-10 NOTE — ASSESSMENT & PLAN NOTE
Discussed diet and exercise  I recommend that you try/continue to decrease the amount of carbohydrates that you ingest (bread products, rice, pasta, potatoes, cereals, starchy vegetables and high sugar containing foods and  beverages). Also try to increase the amount of vegetables and protein that you eat daily.  Decrease the amount of processed and fast foods. Try to exercise at least 30 minutes per day, 4-5 times per week, combination of cardio and weight training.     Discussed bariatric surgery options-referral placed

## 2025-07-10 NOTE — ASSESSMENT & PLAN NOTE
Screening labs ordered  Return for well adult physical   I recommend that you try/continue to decrease the amount of carbohydrates that you ingest (bread products, rice, pasta, potatoes, cereals, starchy vegetables and high sugar containing foods and  beverages). Also try to increase the amount of vegetables and protein that you eat daily.  Decrease the amount of processed and fast foods. Try to exercise at least 30 minutes per day, 4-5 times per week, combination of cardio and weight training.

## 2025-07-14 ENCOUNTER — HOSPITAL ENCOUNTER (OUTPATIENT)
Age: 35
Discharge: HOME | End: 2025-07-14
Payer: COMMERCIAL

## 2025-07-14 VITALS
DIASTOLIC BLOOD PRESSURE: 80 MMHG | HEART RATE: 91 BPM | RESPIRATION RATE: 18 BRPM | OXYGEN SATURATION: 99 % | TEMPERATURE: 98 F | SYSTOLIC BLOOD PRESSURE: 127 MMHG

## 2025-07-14 DIAGNOSIS — R21 SCROTAL RASH: Primary | ICD-10-CM

## 2025-07-14 DIAGNOSIS — Z11.3 ROUTINE SCREENING FOR STI (SEXUALLY TRANSMITTED INFECTION): ICD-10-CM

## 2025-07-14 PROCEDURE — G0452 MOLECULAR PATHOLOGY INTERPR: HCPCS | Performed by: PHYSICIAN ASSISTANT

## 2025-07-14 PROCEDURE — 99214 OFFICE O/P EST MOD 30 MIN: CPT | Performed by: PHYSICIAN ASSISTANT

## 2025-07-14 PROCEDURE — 87529 HSV DNA AMP PROBE: CPT | Performed by: PHYSICIAN ASSISTANT

## 2025-07-14 PROCEDURE — 87591 N.GONORRHOEAE DNA AMP PROB: CPT | Performed by: PHYSICIAN ASSISTANT

## 2025-07-14 PROCEDURE — 87661 TRICHOMONAS VAGINALIS AMPLIF: CPT | Performed by: PHYSICIAN ASSISTANT

## 2025-07-14 PROCEDURE — 87491 CHLMYD TRACH DNA AMP PROBE: CPT | Performed by: PHYSICIAN ASSISTANT

## 2025-07-14 RX ORDER — MUPIROCIN 2 %
1 OINTMENT (GRAM) TOPICAL 3 TIMES DAILY
Qty: 30 G | Refills: 0 | Status: SHIPPED | OUTPATIENT
Start: 2025-07-14 | End: 2025-07-21

## 2025-07-14 NOTE — ED PROVIDER NOTES
Patient Seen in: [facility]        History  Chief Complaint   Patient presents with    Eval-G     Reports painful rash to testicles      Stated Complaint: EVAL- Testicles, pain    Subjective:   HPI          Patient is a 35-year-old male without chronic medical problems who presents to the immediate care for evaluation of scrotal rash x 4 days.  Patient states that the rash has been somewhat painful and almost feels like a cut.  He states he tried a diaper rash cream but thinks it may have exacerbated the rash.  No fevers or chills.  No testicular pain or swelling.  He states he is  and is in a monogamous relationship and denies specific concerns for STDs, however he states his wife is somewhat concerned for possible herpes.  He denies history of herpes.  No penile discharge or dysuria or urgency or frequency.  He denies any itching associated with the rash.      Objective:     History reviewed. No pertinent past medical history.           History reviewed. No pertinent surgical history.             Social History     Socioeconomic History    Marital status: Single   Tobacco Use    Smoking status: Former     Current packs/day: 0.00     Types: Cigarettes     Quit date: 3/9/2017     Years since quittin.3    Smokeless tobacco: Former   Vaping Use    Vaping status: Never Used   Substance and Sexual Activity    Alcohol use: Yes     Comment: weekends    Drug use: No   Other Topics Concern    Caffeine Concern Yes     Comment: (Soda) 1 cup daily              Review of Systems   Constitutional:  Negative for fever.   Respiratory:  Negative for shortness of breath.    Cardiovascular:  Negative for chest pain.   Gastrointestinal:  Negative for abdominal pain.   Genitourinary:  Negative for dysuria, frequency, hematuria, penile discharge, penile pain, penile swelling, scrotal swelling, testicular pain and urgency.        Scrotal rash.       Positive for stated complaint: EVAL- Testicles, pain  Other  systems are as noted in HPI.  Constitutional and vital signs reviewed.      All other systems reviewed and negative except as noted above.                  Physical Exam    ED Triage Vitals [07/14/25 1617]   /80   Pulse 91   Resp 18   Temp 98.2 °F (36.8 °C)   Temp src Oral   SpO2 99 %   O2 Device None (Room air)       Current Vitals:   Vital Signs  BP: 127/80  Pulse: 91  Resp: 18  Temp: 98.2 °F (36.8 °C)  Temp src: Oral    Oxygen Therapy  SpO2: 99 %  O2 Device: None (Room air)            Physical Exam  Vitals and nursing note reviewed. Chaperone present: Patient deferred..   Constitutional:       General: He is not in acute distress.     Appearance: Normal appearance. He is not ill-appearing.   HENT:      Head: Normocephalic and atraumatic.      Right Ear: External ear normal.      Left Ear: External ear normal.      Mouth/Throat:      Mouth: Mucous membranes are moist.      Pharynx: Oropharynx is clear.   Eyes:      Extraocular Movements: Extraocular movements intact.      Conjunctiva/sclera: Conjunctivae normal.      Pupils: Pupils are equal, round, and reactive to light.   Cardiovascular:      Pulses: Normal pulses.   Genitourinary:     Comments: Mildly erythematous scabbed over lesions to the anterior scrotum.  Minimally tender.  No crepitus, induration, fluctuance, purulence or ulcerations.  No testicular pain or swelling.   Skin:     Capillary Refill: Capillary refill takes less than 2 seconds.   Neurological:      Mental Status: He is alert.      Sensory: No sensory deficit.      Motor: No weakness.                 ED Course  Labs Reviewed   HSV 1/2 SUBTYPE BY PCR (LESION-ONLY)   TRICH VAG BY BJ   CHLAMYDIA/GONOCOCCUS, BJ                            MDM     Patient is a 35-year-old male without chronic medical problems who presents to immediate care for evaluation of scrotal rash x 4 days.  Patient provides a history.  He presents to the immediate care well-appearing with grossly normal vital signs.   Physical exam shows a mildly red scabbed over rash to the anterior scrotum.  Discussed with patient clinical impression of possible mechanical irritation or folliculitis.  Less likely intertrigo or candidal rash or tinea as as he does not have any significant itching.  Rash does not seem highly consistent with HSV, however he would like testing for this so a swab was obtained of the lesions and will send out for testing.  He also requested testing for gonorrhea, chlamydia and trichomonas by urine testing.  Will treat for any pertinent positives.  Recommend topical mupirocin to the rash and he should avoid mechanical irritation.  No evidence of cellulitis or abscess at this time.  Discussed follow-up and return precautions.  Patient expressed understanding and agreement with plan.  All questions answered.        Medical Decision Making      Disposition and Plan     Clinical Impression:  1. Scrotal rash    2. Routine screening for STI (sexually transmitted infection)         Disposition:  Discharge  7/14/2025  4:45 pm    Follow-up:  Navarro Wharton DO  [address] [place] [address]  [phone]    In 1 week  As needed          Medications Prescribed:  Current Discharge Medication List        START taking these medications    Details   mupirocin 2 % External Ointment Apply 1 Application topically 3 (three) times daily for 7 days.  Qty: 30 g, Refills: 0                   Supplementary Documentation:

## 2025-07-15 LAB
C TRACH DNA SPEC QL NAA+PROBE: NEGATIVE
N GONORRHOEA DNA SPEC QL NAA+PROBE: NEGATIVE

## 2025-07-17 LAB — TRICH VAG NAA: NEGATIVE

## 2025-07-18 LAB
HSV 1 NAA: NEGATIVE
HSV 1 NAA: NEGATIVE
HSV 2 NAA: NEGATIVE
HSV 2 NAA: NEGATIVE

## (undated) NOTE — LETTER
Date & Time: 3/12/2021, 10:21 AM  Patient: Stefan Doshi  Encounter Provider(s):    WILTON Yip       To Whom It May Concern:    Stefan Doshi was seen and treated in our department on 3/12/2021. He can return to work.     If you have any questions

## (undated) NOTE — LETTER
8/14/2021          To Whom It May Concern:    Heather Pino is currently under my medical care and may not return to work at this time. Please excuse Obed Florestae for 2 days. 8/-/21 and 8/10/21  He may return to work on 8/11/2021.   Activity is restricted as foll

## (undated) NOTE — LETTER
[address]  [place] [address]  Dept: [phone]  Dept Fax: [phone]  Loc: [phone]      March 10, 2017    Patient: Ignacio Nevess   Date of Visit: 3/10/2017       To Whom It May Concern:    Celia Wesley

## (undated) NOTE — LETTER
11/09/21        Terrance Rainey  [address] [address]      Dear Jamison Lozano,    [facility] records indicate that you have outstanding lab work and or testing that was ordered for you and has not yet been completed:  Orders Placed This [address] [facility]

## (undated) NOTE — ED AVS SNAPSHOT
[facility] AND [facility] in [facility].  [address] [facility]    Phone:  [phone]    Fax:  [phone]           Alfreda Rosa Maria   MRN: [id number]    Department:  [facility] AND [facility] in [address] [place] [address]   Date of Visit:  3/1 under your health insurance plan. Please contact your insurance company and physician's office to determine coverage and benefits available for follow-up care and referrals.      It is our goal to assure that you are completely satisfied with every aspect doctor until you can check with your doctor. Please bring the medication list to your next doctor's appointment. Any imaging studies and labs completed today can be reviewed in your [employer] account.   You may have had testing done that requires us to co Medicaid plans. To get signed up and covered, please call [phone] and ask to get set up for an insurance coverage that is in-network with [place] [address]. Chas     Sign up for [employer], your secure online medical record.   [employer] [facility]

## (undated) NOTE — LETTER
3/9/2018          To Whom It May Concern:    Venita Jackson is currently under my medical care and may not return to work at this time. Please excuse Frandylizziedaniela Amarilis for 1 day. He may return to work on 03/12/2018. Activity is restricted as follows: none.     If you